# Patient Record
Sex: FEMALE | URBAN - METROPOLITAN AREA
[De-identification: names, ages, dates, MRNs, and addresses within clinical notes are randomized per-mention and may not be internally consistent; named-entity substitution may affect disease eponyms.]

---

## 2022-12-12 ENCOUNTER — APPOINTMENT (RX ONLY)
Dept: URBAN - METROPOLITAN AREA CLINIC 17 | Facility: CLINIC | Age: 82
Setting detail: DERMATOLOGY
End: 2022-12-12

## 2022-12-12 DIAGNOSIS — B07.8 OTHER VIRAL WARTS: ICD-10-CM

## 2022-12-12 DIAGNOSIS — D485 NEOPLASM OF UNCERTAIN BEHAVIOR OF SKIN: ICD-10-CM | Status: INADEQUATELY CONTROLLED

## 2022-12-12 DIAGNOSIS — R23.3 SPONTANEOUS ECCHYMOSES: ICD-10-CM

## 2022-12-12 PROBLEM — D48.5 NEOPLASM OF UNCERTAIN BEHAVIOR OF SKIN: Status: ACTIVE | Noted: 2022-12-12

## 2022-12-12 PROCEDURE — ? LIQUID NITROGEN

## 2022-12-12 PROCEDURE — ? COUNSELING

## 2022-12-12 PROCEDURE — ? TREATMENT REGIMEN

## 2022-12-12 PROCEDURE — ? OBSERVATION

## 2022-12-12 PROCEDURE — 99203 OFFICE O/P NEW LOW 30 MIN: CPT | Mod: 25

## 2022-12-12 PROCEDURE — 17110 DESTRUCTION B9 LES UP TO 14: CPT

## 2022-12-12 ASSESSMENT — LOCATION SIMPLE DESCRIPTION DERM
LOCATION SIMPLE: LEFT PRETIBIAL REGION
LOCATION SIMPLE: RIGHT PRETIBIAL REGION
LOCATION SIMPLE: CHEST

## 2022-12-12 ASSESSMENT — LOCATION DETAILED DESCRIPTION DERM
LOCATION DETAILED: LEFT DISTAL PRETIBIAL REGION
LOCATION DETAILED: RIGHT PROXIMAL PRETIBIAL REGION
LOCATION DETAILED: RIGHT LATERAL SUPERIOR CHEST

## 2022-12-12 ASSESSMENT — LOCATION ZONE DERM
LOCATION ZONE: LEG
LOCATION ZONE: TRUNK

## 2022-12-12 NOTE — PROCEDURE: COUNSELING
Detail Level: Simple
Neha Recommendations: Over the counter dermamend cream.
Detail Level: Generalized
Detail Level: Detailed

## 2022-12-12 NOTE — PROCEDURE: LIQUID NITROGEN
Show Aperture Variable?: Yes
Detail Level: Detailed
Consent: Verbal consent obtained
Post-Care Instructions: Written wound care provided
Render Note In Bullet Format When Appropriate: No
Medical Necessity Information: It is in your best interest to select a reason for this procedure from the list below. All of these items fulfill various CMS LCD requirements except the new and changing color options.
Number Of Freeze-Thaw Cycles: 2 freeze-thaw cycles
Medical Necessity Clause: Treatment was medically justified for the following:
Spray Paint Text: The liquid nitrogen was applied to the skin utilizing a spray paint frosting technique.

## 2022-12-12 NOTE — PROCEDURE: TREATMENT REGIMEN
Detail Level: Simple
Plan: Pt reports nodule has been present for around 3 weeks, the bump appeared and her foot turned black and blue. Informed patient that if lesion does not resolve in a few weeks, pt can call for an order of ultra sound.
Plan: Pt reports that she takes aspirin, 3 tabs weekly. Due to pts underlying high platelet count and thinning skin, upon exam it looks like pt has been unknowingly scratching herself or brushing up against objects in daily life. Discussed possible addition of Dermamend.

## 2022-12-12 NOTE — HPI: RASH
How Severe Is Your Rash?: mild
Is This A New Presentation, Or A Follow-Up?: Rash
Additional History: Pt reports rash first appeared on lower legs 8-9 months ago. The rash comes and goes, it is not itchy and besides a daily moisturizer pt does not apply anything to her lower legs. Pt reports having had blood work ordered by her rheumatologist Dr Estevez, but nothing significant was shown in lab work.  She gives a history of Lupus Arthritis for over 30 years. She notes that she has thrombocytosis and does take an aspirin 3x weekly.  She denies noted preceding trauma to the lower legs prior to the onset of the lesions. She does note that the lesions self resolve after 1-2 weeks. She currently has one or two active lesions on the left lower leg and has representative photos from previous eruptions. She has most recently developed a bump on the right anterior right shin which is tender and was accompanied by significant bruising of the right foot and ankle. She does not recall trauma to the right shin but does state the lesion has not changed much in size since initial development and the bruising has been improving.